# Patient Record
Sex: MALE | Race: WHITE | NOT HISPANIC OR LATINO | URBAN - NONMETROPOLITAN AREA
[De-identification: names, ages, dates, MRNs, and addresses within clinical notes are randomized per-mention and may not be internally consistent; named-entity substitution may affect disease eponyms.]

---

## 2020-01-06 ENCOUNTER — IMPORTED ENCOUNTER (OUTPATIENT)
Dept: URBAN - NONMETROPOLITAN AREA CLINIC 1 | Facility: CLINIC | Age: 65
End: 2020-01-06

## 2020-01-06 PROBLEM — H25.13: Noted: 2020-01-06

## 2020-01-06 PROBLEM — H40.053: Noted: 2020-01-06

## 2020-01-06 PROBLEM — H52.223: Noted: 2020-01-06

## 2020-01-06 PROBLEM — H52.4: Noted: 2020-01-06

## 2020-01-06 PROBLEM — H52.02: Noted: 2020-01-06

## 2020-01-06 PROCEDURE — 92015 DETERMINE REFRACTIVE STATE: CPT

## 2020-01-06 PROCEDURE — 92004 COMPRE OPH EXAM NEW PT 1/>: CPT

## 2020-01-06 NOTE — PATIENT DISCUSSION
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

## 2020-07-07 ENCOUNTER — IMPORTED ENCOUNTER (OUTPATIENT)
Dept: URBAN - NONMETROPOLITAN AREA CLINIC 1 | Facility: CLINIC | Age: 65
End: 2020-07-07

## 2020-07-07 PROBLEM — H25.13: Noted: 2020-07-07

## 2020-07-07 PROBLEM — H52.223: Noted: 2020-01-06

## 2020-07-07 PROBLEM — H52.4: Noted: 2020-01-06

## 2020-07-07 PROBLEM — H40.053: Noted: 2020-07-07

## 2020-07-07 PROBLEM — H52.02: Noted: 2020-01-06

## 2020-07-07 PROCEDURE — 76514 ECHO EXAM OF EYE THICKNESS: CPT

## 2020-07-07 PROCEDURE — 99213 OFFICE O/P EST LOW 20 MIN: CPT

## 2020-07-07 PROCEDURE — 92133 CPTRZD OPH DX IMG PST SGM ON: CPT

## 2020-07-07 NOTE — PATIENT DISCUSSION
Ocular Hypertension-  discussed findings w/patient-  IOP 16 16 stable -  C/D 0.6/0.7 OD and 0.6/0.75 OS-  OCT ONH done today 7/72020   OD: 8/10 SS 91um normal RNFL all quadrants    OS: 8/10 SS 91um normal RNFL all quadrants -  Pach done today 7/7/2020: 553 561-  discussed the possibility of iStent at the time of cataract surgery-  continue Latanoprost QHS OU reviewed the common side effects of long term use such as KASEY and lid conditions-  monitor 6 month Complete w/24-2 VF Cataracts OU -  discussed findings w/patient-  discussed referral to Artur Hines for Cat Eval in the near future will continue to monitor -  UV protection recommended-  BAT done today 7/7/2020 20/50- OD&OS-  discussed with patient the possibility of iStent at the time of cataract surgery -  monitor 6 month Complete w/BAT; 's Notes: MR 1/6/2020DFE 1/6/2020OCT Dre Akers 73 7/7/2020Pach 7/7/202024-2 VF

## 2021-01-07 ENCOUNTER — IMPORTED ENCOUNTER (OUTPATIENT)
Dept: URBAN - NONMETROPOLITAN AREA CLINIC 1 | Facility: CLINIC | Age: 66
End: 2021-01-07

## 2021-01-07 PROCEDURE — 92083 EXTENDED VISUAL FIELD XM: CPT

## 2021-01-07 NOTE — PATIENT DISCUSSION
Testing [id number] VFOD: R scattered scotoma non-specific defects baselineOS: R scattered scotoma non-specific defects baselineno glaucomatous defects noted OD/OS; 's Notes: MR 1/6/2020DFE 1/6/2020OCT Dre Akers 73 7/7/2020Pach 7/7/202024-2 VF 1/7/2021

## 2021-01-21 ENCOUNTER — IMPORTED ENCOUNTER (OUTPATIENT)
Dept: URBAN - NONMETROPOLITAN AREA CLINIC 1 | Facility: CLINIC | Age: 66
End: 2021-01-21

## 2021-01-21 PROCEDURE — 92015 DETERMINE REFRACTIVE STATE: CPT

## 2021-01-21 PROCEDURE — 92014 COMPRE OPH EXAM EST PT 1/>: CPT

## 2021-01-21 NOTE — PATIENT DISCUSSION
Simple Astigmatsim OD/ Mixed Hyperopic Astigmatism OS w/ Presbyopia-  discussed findings w/ patient-  no new spectacle Rx issued today -  monitor prnCombined Cataracts OU-  discussed findings w/ patient-  UV  protection Recommmended -  Significant progression noted today with patient's complaint of decreased VA-  Recommend referral to Dr. Burton Red for further evaluation/possible treatment pt agreed with plan. Ocular Hypertension OU: -  Discussed findings w/ pt today-  IOP noted at 15 OU stable.  -  C/D 0.6/0.7 OD and 0.6/0.75 OS-  OCT ONH done 7/72020       OD: 8/10 SS 91um normal RNFL all quadrants        OS: 8/10 SS 91um normal RNFL all quadrants -  Pach done 7/7/2020: 553 561-  24-2 VF done 1/7/2021        OD: R scattered scotoma non-specific defects baseline        OS: R scattered scotoma non-specific defects baseline no glaucomatous                defects noted OD/OS-  discussed the possibility of iStent at the time of cataract surgery and to discuss further with Dr. Burton Red at his evaluation-  continue Latanoprost QHS OU reviewed the common side effects of long term use such as KASEY and lid conditions- will continue to follow closely after cataract surgery has been completed if Dr. Burton Red recommend(s)Type II DM without retinopathy-  Discussed findings w/ pt today- No diabetic retinopathy noted on exam today- Mild retinopathy noted on exam- Stressed importance of good blood sugar control and how it can affect ocular health/vision- Continue to monitor; 's Notes: MR 1/21/2021DFE 1/21/2021OCT Dre Akers 73 7/7/2020Pach 7/7/202024-2 VF 1/7/2021

## 2021-01-30 ENCOUNTER — IMPORTED ENCOUNTER (OUTPATIENT)
Dept: URBAN - NONMETROPOLITAN AREA CLINIC 1 | Facility: CLINIC | Age: 66
End: 2021-01-30

## 2021-01-30 PROBLEM — H40.11X1: Noted: 2021-01-30

## 2021-01-30 PROBLEM — H25.13: Noted: 2021-01-30

## 2021-01-30 PROCEDURE — 92014 COMPRE OPH EXAM EST PT 1/>: CPT

## 2021-01-30 NOTE — PATIENT DISCUSSION
Cataract(s)-Visually significant cataract OU .-Cataract(s) causing symptomatic impairment of visual function not correctable with a tolerable change in glasses or contact lenses lighting or non-operative means resulting in specific activity limitations and/or participation restrictions including but not limited to reading viewing television driving or meeting vocational or recreational needs. -Expectation is clearer vision and functional improvement in symptoms as well as reduced glare disability after cataract removal.-Order IOLMaster and OPD today. -Recommend   Limbal Relaxing Incisions based on today's OPD testing and lifestyle questionnaire.-All questions were answered regarding surgery including pre and post-op medications appointments activity restrictions and anesthetic usage.-The risks benefits and alternatives and special risk factors for the patient were discussed in detail including but not limited to: bleeding infection retinal detachment vitreous loss problems with the implant and possible need for additional surgery.-Although rare the possibility of complete vision loss was discussed.-The possible need for glasses post-operatively was discussed.-Order medical clearance exam based on history of NIDDM -Patient elects to proceed with cataract surgery OD . Will schedule at patient's convenience and re-evaluate OS  in the future. Discussed all lens options w/ patinet  he elects Stand/Trad OU and understands he will need new gls rx s/p to correct his astigmatism. Discussed LenSX w/ LRI and benefits from this less w/ less dependence on glasses for distance and will only need glasses for reading.  POAG Disucssed w/ patinet Stable @ this timeContinue the Latanoprost OU QHS Reviewed recent VF and patient qualifies for Istent OU Informed him he will continue to use the gtts s/p unless Dr Boogie Estrada feels he is able to d/c.; 's Notes: MR 1/21/2021DFE 1/21/2021OCT Dre Akers 73 7/7/2020Pach 7/7/202024-2 VF 1/7/2021

## 2021-03-02 ENCOUNTER — IMPORTED ENCOUNTER (OUTPATIENT)
Dept: URBAN - NONMETROPOLITAN AREA CLINIC 1 | Facility: CLINIC | Age: 66
End: 2021-03-02

## 2021-03-02 PROBLEM — H40.11X1: Noted: 2021-03-02

## 2021-03-02 PROBLEM — H25.13: Noted: 2021-03-02

## 2021-03-09 ENCOUNTER — IMPORTED ENCOUNTER (OUTPATIENT)
Dept: URBAN - NONMETROPOLITAN AREA CLINIC 1 | Facility: CLINIC | Age: 66
End: 2021-03-09

## 2021-03-09 PROBLEM — H25.12: Noted: 2021-03-09

## 2021-03-09 PROBLEM — H40.11X1: Noted: 2021-03-09

## 2021-03-09 PROBLEM — I10: Noted: 2021-03-15

## 2021-03-09 PROBLEM — Z98.41: Noted: 2021-03-09

## 2021-03-09 PROBLEM — E78.5: Noted: 2021-03-15

## 2021-03-09 PROBLEM — Z01.818: Noted: 2021-03-15

## 2021-03-09 PROCEDURE — 99024 POSTOP FOLLOW-UP VISIT: CPT

## 2021-03-09 NOTE — PATIENT DISCUSSION
s/p PC IOL OD Stand/Trad w/Istent 3/8/2021-  discussed findings w/patient-  Pt doing well s/p PCIOL. -  Continue post-op gtts according to instruction sheet and sleep with eye shield over eye for 7 nights. -  Avoid bending at the waist lifting anything over 5lbs and dirty or lucy environments.-  RTC as scheduled or prn; 's Notes: MR 1/21/2021DFE 1/21/2021OCT Dre Akers 73 7/7/2020Pach 7/7/202024-2 VF 1/7/2021

## 2021-03-15 ENCOUNTER — IMPORTED ENCOUNTER (OUTPATIENT)
Dept: URBAN - NONMETROPOLITAN AREA CLINIC 1 | Facility: CLINIC | Age: 66
End: 2021-03-15

## 2021-03-15 PROCEDURE — 99024 POSTOP FOLLOW-UP VISIT: CPT

## 2021-03-15 NOTE — PATIENT DISCUSSION
Cataract(s)-Visually significant cataract OS . -Cataract(s) causing symptomatic impairment of visual function not correctable with a tolerable change in glasses or contact lenses lighting or non-operative means resulting in specific activity limitations and/or participation restrictions including but not limited to reading viewing television driving or meeting vocational or recreational needs. -Expectation is clearer vision and functional improvement in symptoms as well as reduced glare disability after cataract removal.-Recommend Stand/trad/Istent based on previous OPD testing and lifestyle questionnaire.-All questions were answered regarding surgery including pre and post-op medications appointments activity restrictions and anesthetic usage.-The risks benefits and alternatives and special risk factors for the patient were discussed in detail including but not limited to: bleeding infection retinal detachment vitreous loss problems with the implant and possible need for additional surgery.-Although rare the possibility of complete vision loss was discussed.-The need for glasses post-operatively was discussed.-Patient elects to proceed with cataract surgery OS . s/p PCIOL-Pt doing well at 1 week s/p PCIOL. -Continue post-op gtts according to instruction sheet.-Okay to resume usual activites and d/c eye shield.; 's Notes: MR 1/21/2021DFE 1/21/2021MAR Akers 73 7/7/2020Pach 7/7/202024-2 VF 1/7/2021

## 2021-03-15 NOTE — PATIENT DISCUSSION
Medical Clearance-Medical clearance done today. -No outstanding concerns that would preclude surgery.-Patient is cleared to proceed with scheduled surgery.; 's Notes: MR 1/21/2021DFE 1/21/2021OCT [facility] [place] 7/7/2020Pach 7/7/202024-2 VF 1/7/2021

## 2021-03-26 ENCOUNTER — IMPORTED ENCOUNTER (OUTPATIENT)
Dept: URBAN - NONMETROPOLITAN AREA CLINIC 1 | Facility: CLINIC | Age: 66
End: 2021-03-26

## 2021-03-26 PROBLEM — Z98.42: Noted: 2021-03-26

## 2021-03-26 PROCEDURE — 99024 POSTOP FOLLOW-UP VISIT: CPT

## 2021-03-26 NOTE — PATIENT DISCUSSION
s/p PC IOL OS Stand/Trad Istent 3/25/2021-  discussed findings w/patient-  Pt doing well s/p PCIOL. -  Continue post-op gtts according to instruction sheet and sleep with eye shield over eye for 7 nights. -  Avoid bending at the waist lifting anything over 5lbs and dirty or lucy environments.-  RTC as schedule or prn; 's Notes: MR 1/21/2021DFE 1/21/2021OCT [facility] 7/7/2020Pach 7/7/202024-2 VF 1/7/2021

## 2021-04-02 ENCOUNTER — IMPORTED ENCOUNTER (OUTPATIENT)
Dept: URBAN - NONMETROPOLITAN AREA CLINIC 1 | Facility: CLINIC | Age: 66
End: 2021-04-02

## 2021-04-02 PROCEDURE — 99024 POSTOP FOLLOW-UP VISIT: CPT

## 2021-04-02 NOTE — PATIENT DISCUSSION
s/p PC IOL OS Stand/Trad Istent 3/25/2021-  discussed findings w/patient-  Pt doing well at 1 week s/p PCIOL. -  Continue post-op gtts according to instruction sheet.-  Okay to resume usual activites and d/c eye shield. -  RTC 3 weeks PORM (may want to do CL's); 's Notes: MR 1/21/2021DFE 1/21/2021OCT Dre Akers 73 7/7/2020Pach 7/7/202024-2 VF 1/7/2021

## 2021-04-08 ENCOUNTER — IMPORTED ENCOUNTER (OUTPATIENT)
Dept: URBAN - NONMETROPOLITAN AREA CLINIC 1 | Facility: CLINIC | Age: 66
End: 2021-04-08

## 2021-04-08 PROBLEM — H20.021: Noted: 2021-04-08

## 2021-04-08 PROBLEM — Z98.42: Noted: 2021-03-26

## 2021-04-08 PROCEDURE — 99024 POSTOP FOLLOW-UP VISIT: CPT

## 2021-04-08 NOTE — PATIENT DISCUSSION
s/p PC IOL OS Stand/Trad Istent 3/25/2021-  discussed findings w/patient-  Pt doing well at 1 week s/p PCIOL. -  Continue post-op gtts according to instruction sheet.-  Okay to resume usual activites and d/c eye shield. -  RTC 3 weeks PORM (may want to do CL's)Rebound Iritis OD-  discussed findings w/ patient -  start Pred Forte TID OD x 1 week then BID x 1 week-  Start Bromsite QD OD-  contineu to monitor w/ 2-3 week f/u; 's Notes: MR 1/21/2021DFE 1/21/2021OCT Dre Akers 73 7/7/2020Pach 7/7/202024-2 VF 1/7/2021

## 2021-04-15 PROBLEM — H52.223: Noted: 2021-01-21

## 2021-04-15 PROBLEM — H52.4: Noted: 2021-01-21

## 2021-04-15 PROBLEM — H40.053: Noted: 2021-04-15

## 2021-04-15 PROBLEM — H52.02: Noted: 2021-01-21

## 2021-04-15 PROBLEM — H25.813: Noted: 2021-04-15

## 2021-04-15 PROBLEM — E11.9: Noted: 2021-04-15

## 2021-04-26 ENCOUNTER — IMPORTED ENCOUNTER (OUTPATIENT)
Dept: URBAN - NONMETROPOLITAN AREA CLINIC 1 | Facility: CLINIC | Age: 66
End: 2021-04-26

## 2021-04-26 PROCEDURE — 92015 DETERMINE REFRACTIVE STATE: CPT

## 2021-04-26 PROCEDURE — 99024 POSTOP FOLLOW-UP VISIT: CPT

## 2021-04-26 NOTE — PATIENT DISCUSSION
Pseudophakia OU-  discussed findings w/patient-  doing well at this time-  new spectacle/CL Rx issued-  continue to monitor at 3 mo DFE or prnRebound Iritis OD-  discussed findings w/ patient -  patient has finished drops-  doing well at this time-  continue to monitor at 3 mo DFE or prn; 's Notes: MR 4/26/2021DFE 1/21/2021OCT [facility] [place] 7/7/2020Pach 7/7/202024-2 VF 1/7/2021

## 2021-08-27 ENCOUNTER — IMPORTED ENCOUNTER (OUTPATIENT)
Dept: URBAN - NONMETROPOLITAN AREA CLINIC 1 | Facility: CLINIC | Age: 66
End: 2021-08-27

## 2021-08-27 PROCEDURE — 99024 POSTOP FOLLOW-UP VISIT: CPT

## 2021-08-27 NOTE — PATIENT DISCUSSION
Pseudophakia OU-  discussed findings w/patient-  doing well at this time-  PCO noted OU discussed signs associated w/progression -  continue to monitor Ocular Hypertension-  discussed diagnosis in detail w/ patient -  IOP 17 OU -  Cup to Disc: 0.6 OU -  Patient has been using latanoprost d/c Latanoprost at this time. -  RTC in 4-6 weeks for IOP check PVD OU-  Discussed findings of exam in detail with the patient. -  The risk of retinal detachment in patients with PVDs was discussed with the patient and the warning signs of retinal detachment were carefully reviewed with the patient. -  The patient was warned to return to the office or contact the ophthalmologist on call immediately if they experience signs of retinal detachment.; 's Notes: MR 4/26/2021DFE 1/21/2021OCT [facility] [place] 7/7/2020Pach 7/7/202024-2 VF 1/7/2021

## 2021-08-30 PROBLEM — H43.813: Noted: 2021-08-30

## 2021-08-30 PROBLEM — Z96.1: Noted: 2021-08-30

## 2021-08-30 PROBLEM — H26.493: Noted: 2021-08-30

## 2021-08-30 PROBLEM — H40.053: Noted: 2021-08-30

## 2021-11-16 ENCOUNTER — PREPPED CHART (OUTPATIENT)
Dept: URBAN - NONMETROPOLITAN AREA CLINIC 4 | Facility: CLINIC | Age: 66
End: 2021-11-16

## 2021-11-16 ENCOUNTER — IMPORTED ENCOUNTER (OUTPATIENT)
Dept: URBAN - NONMETROPOLITAN AREA CLINIC 1 | Facility: CLINIC | Age: 66
End: 2021-11-16

## 2021-11-16 PROCEDURE — 99213 OFFICE O/P EST LOW 20 MIN: CPT

## 2021-11-16 NOTE — PATIENT DISCUSSION
Ocular Hypertension-  discussed diagnosis in detail w/ patient -  IOPs 19 19-  Cup to Disc: 0.6 OU -  patient has istent OS-  restart Latanoprost QHS OU-  RTC 3 mo f/u w/OCT ON; 's Notes: MR 4/26/2021DFE 1/21/2021OCT Dre Akers 73 7/7/2020Pach 7/7/202024-2 VF 1/7/2021

## 2022-03-25 ASSESSMENT — VISUAL ACUITY
OS_SC: 20/30
OD_SC: 20/20

## 2022-03-29 ENCOUNTER — FOLLOW UP (OUTPATIENT)
Dept: URBAN - NONMETROPOLITAN AREA CLINIC 4 | Facility: CLINIC | Age: 67
End: 2022-03-29

## 2022-03-29 DIAGNOSIS — H40.053: ICD-10-CM

## 2022-03-29 PROCEDURE — 92133 CPTRZD OPH DX IMG PST SGM ON: CPT

## 2022-03-29 PROCEDURE — 99213 OFFICE O/P EST LOW 20 MIN: CPT

## 2022-03-29 ASSESSMENT — VISUAL ACUITY
OS_SC: 20/25
OD_SC: 20/20-1

## 2022-03-29 ASSESSMENT — TONOMETRY
OS_IOP_MMHG: 17
OD_IOP_MMHG: 16

## 2022-04-17 ASSESSMENT — VISUAL ACUITY
OS_CC: 20/30
OD_CC: 20/20 NV
OS_SC: 20/20-2
OU_CC: 20/15
OD_CC: 20/40+2
OU_CC: 20/20 NV
OS_GLARE: 20/50
OD_CC: 20/20
OU_SC: 20/25
OS_CC: 20/20
OU_CC: 20/20
OU_CC: 20/20
OD_SC: 20/30
OD_PH: 20/25-1
OU_CC: 20/20
OS_CC: 20/20-1
OS_PH: 20/20
OU_CC: J1+
OS_GLARE: 20/40
OS_CC: 20/40
OS_SC: 20/25-1
OD_CC: 20/20
OD_PAM: 20/20
OS_SC: 20/20
OS_GLARE: 20/50
OD_GLARE: 20/50
OD_SC: 20/25-1
OS_PH: 20/25
OD_CC: 20/20
OS_CC: 20/20
OD_SC: 20/25+2
OD_PH: 20/20
OS_GLARE: 20/50-
OS_CC: 20/30-1
OD_GLARE: 20/50-
OD_SC: 20/30+2
OS_AM: 20/20
OS_AM: 20/20
OD_GLARE: 20/40
OD_CC: 20/20+2
OS_CC: 20/40
OS_SC: 20/25-1

## 2022-04-17 ASSESSMENT — TONOMETRY
OS_IOP_MMHG: 17
OS_IOP_MMHG: 18
OD_IOP_MMHG: 16
OS_IOP_MMHG: 16
OS_IOP_MMHG: 17
OS_IOP_MMHG: 16
OS_IOP_MMHG: 21
OD_IOP_MMHG: 15
OS_IOP_MMHG: 15
OS_IOP_MMHG: 19
OD_IOP_MMHG: 17
OS_IOP_MMHG: 18
OD_IOP_MMHG: 15
OD_IOP_MMHG: 15
OS_IOP_MMHG: 18
OD_IOP_MMHG: 18
OD_IOP_MMHG: 17
OD_IOP_MMHG: 15
OS_IOP_MMHG: 16
OS_IOP_MMHG: 18
OD_IOP_MMHG: 17
OD_IOP_MMHG: 16
OD_IOP_MMHG: 16
OD_IOP_MMHG: 19

## 2022-04-17 ASSESSMENT — PACHYMETRY
OD_CT_UM: 553; ADJ: THIN
OS_CT_UM: 561; ADJ: WNL
OD_CT_UM: 553; ADJ: THIN
OD_CT_UM: 553; ADJ: THIN
OS_CT_UM: 561; ADJ: WNL
OS_CT_UM: 561; ADJ: WNL
OD_CT_UM: 553; ADJ: THIN
OS_CT_UM: 561; ADJ: WNL
OD_CT_UM: 553; ADJ: THIN
OS_CT_UM: 561; ADJ: WNL
OD_CT_UM: 553; ADJ: THIN
OS_CT_UM: 561; ADJ: WNL
OD_CT_UM: 553; ADJ: THIN
OD_CT_UM: 553; ADJ: THIN
OS_CT_UM: 561; ADJ: WNL
OS_CT_UM: 561; ADJ: WNL

## 2022-11-09 ENCOUNTER — CONSULTATION/EVALUATION (OUTPATIENT)
Dept: URBAN - NONMETROPOLITAN AREA CLINIC 4 | Facility: CLINIC | Age: 67
End: 2022-11-09

## 2022-11-09 DIAGNOSIS — H26.493: ICD-10-CM

## 2022-11-09 PROCEDURE — 99214 OFFICE O/P EST MOD 30 MIN: CPT

## 2022-11-09 PROCEDURE — 66821 AFTER CATARACT LASER SURGERY: CPT

## 2022-11-09 ASSESSMENT — VISUAL ACUITY
OD_SC: 20/20-2
OS_SC: 20/30
OS_BAT: 20/40
OD_BAT: 20/40-1

## 2022-11-09 ASSESSMENT — TONOMETRY
OS_IOP_MMHG: 18
OD_IOP_MMHG: 17

## 2022-11-09 NOTE — PATIENT DISCUSSION
Observe for changes and progression. Patient to continue with current treatment regimen. Improving Improving Improving

## 2022-11-09 NOTE — PROCEDURE NOTE: CLINICAL
PROCEDURE NOTE: YAG Capsulotomy OS. Diagnosis: Other Secondary Cataract. Anesthesia: Topical. The purpose and nature of the procedure, possible alternative methods of treatment, the risks involved and the possibility of complications were discussed with patient. The Patient wishes to proceed and the consent was signed. 1 gtt Prolensa applied. The laser was then performed under topical anesthesia with no complications. Post op instructions were given to patient as well as a follow-up appointment. Patient was advised to call our office if any questions or concerns. 1.9 MJ # 32 shots.

## 2022-11-21 ENCOUNTER — CLINIC PROCEDURE ONLY (OUTPATIENT)
Dept: URBAN - NONMETROPOLITAN AREA CLINIC 4 | Facility: CLINIC | Age: 67
End: 2022-11-21

## 2022-11-21 DIAGNOSIS — H26.491: ICD-10-CM

## 2022-11-21 PROCEDURE — 66821 AFTER CATARACT LASER SURGERY: CPT

## 2022-11-21 ASSESSMENT — VISUAL ACUITY
OD_SC: 20/30
OU_SC: 20/20
OD_BAT: 20/40
OU_SC: 20/50
OS_SC: 20/20-1

## 2022-11-21 ASSESSMENT — TONOMETRY
OS_IOP_MMHG: 19
OD_IOP_MMHG: 19

## 2022-11-21 NOTE — PATIENT DISCUSSION
(Surgical)  Visually Significant secondary to glare; schedule YAG OD Cap. Pros, cons, risks and benefits discussed with patient. Patient wishes to proceed.

## 2022-11-21 NOTE — PROCEDURE NOTE: CLINICAL
PROCEDURE NOTE: YAG Capsulotomy OD. Diagnosis: Other Secondary Cataract. Anesthesia: Topical. The purpose and nature of the procedure, possible alternative methods of treatment, the risks involved and the possibility of complications were discussed with patient. The Patient wishes to proceed and the consent was signed. 1 gtt Prolensa applied. The laser was then performed under topical anesthesia with no complications. Post op instructions were given to patient as well as a follow-up appointment. Patient was advised to call our office if any questions or concerns. 12 shots MJ 2.4.

## 2022-12-08 ENCOUNTER — POST-OP (OUTPATIENT)
Dept: URBAN - NONMETROPOLITAN AREA CLINIC 4 | Facility: CLINIC | Age: 67
End: 2022-12-08

## 2022-12-08 PROCEDURE — 92310-E CONTACT LENS FITTING ESTABLISH PATIENT

## 2022-12-08 PROCEDURE — 92015 DETERMINE REFRACTIVE STATE: CPT

## 2022-12-08 PROCEDURE — 99024 POSTOP FOLLOW-UP VISIT: CPT

## 2022-12-08 ASSESSMENT — VISUAL ACUITY
OS_SC: 20/30
OD_SC: 20/25

## 2022-12-08 ASSESSMENT — TONOMETRY
OD_IOP_MMHG: 18
OS_IOP_MMHG: 16

## 2023-06-22 ENCOUNTER — FOLLOW UP (OUTPATIENT)
Dept: URBAN - NONMETROPOLITAN AREA CLINIC 4 | Facility: CLINIC | Age: 68
End: 2023-06-22

## 2023-06-22 DIAGNOSIS — H43.813: ICD-10-CM

## 2023-06-22 DIAGNOSIS — Z79.4: ICD-10-CM

## 2023-06-22 DIAGNOSIS — H40.1131: ICD-10-CM

## 2023-06-22 DIAGNOSIS — Z98.890: ICD-10-CM

## 2023-06-22 DIAGNOSIS — Z96.1: ICD-10-CM

## 2023-06-22 DIAGNOSIS — E11.9: ICD-10-CM

## 2023-06-22 PROCEDURE — 92014 COMPRE OPH EXAM EST PT 1/>: CPT

## 2023-06-22 ASSESSMENT — VISUAL ACUITY
OS_CC: 20/30
OD_SC: 20/25

## 2023-06-22 ASSESSMENT — TONOMETRY
OS_IOP_MMHG: 14
OD_IOP_MMHG: 14

## 2024-05-09 ENCOUNTER — ESTABLISHED PATIENT (OUTPATIENT)
Dept: URBAN - NONMETROPOLITAN AREA CLINIC 4 | Facility: CLINIC | Age: 69
End: 2024-05-09

## 2024-05-09 DIAGNOSIS — Z98.890: ICD-10-CM

## 2024-05-09 DIAGNOSIS — H40.1131: ICD-10-CM

## 2024-05-09 DIAGNOSIS — Z96.1: ICD-10-CM

## 2024-05-09 DIAGNOSIS — E11.9: ICD-10-CM

## 2024-05-09 DIAGNOSIS — H52.01: ICD-10-CM

## 2024-05-09 DIAGNOSIS — Z79.4: ICD-10-CM

## 2024-05-09 DIAGNOSIS — H43.813: ICD-10-CM

## 2024-05-09 PROCEDURE — 92133 CPTRZD OPH DX IMG PST SGM ON: CPT

## 2024-05-09 PROCEDURE — 92015 DETERMINE REFRACTIVE STATE: CPT

## 2024-05-09 PROCEDURE — 92014 COMPRE OPH EXAM EST PT 1/>: CPT

## 2024-05-09 ASSESSMENT — TONOMETRY
OS_IOP_MMHG: 15
OD_IOP_MMHG: 15

## 2024-05-09 ASSESSMENT — VISUAL ACUITY
OS_SC: 20/30
OD_SC: 20/20-2

## 2025-01-08 ENCOUNTER — FOLLOW UP (OUTPATIENT)
Age: 70
End: 2025-01-08

## 2025-01-08 DIAGNOSIS — E11.9: ICD-10-CM

## 2025-01-08 DIAGNOSIS — Z96.1: ICD-10-CM

## 2025-01-08 DIAGNOSIS — H43.813: ICD-10-CM

## 2025-01-08 DIAGNOSIS — H52.01: ICD-10-CM

## 2025-01-08 DIAGNOSIS — H40.1131: ICD-10-CM

## 2025-01-08 DIAGNOSIS — Z79.4: ICD-10-CM

## 2025-01-08 DIAGNOSIS — Z98.890: ICD-10-CM

## 2025-01-08 PROCEDURE — 99213 OFFICE O/P EST LOW 20 MIN: CPT

## 2025-01-08 PROCEDURE — 92083 EXTENDED VISUAL FIELD XM: CPT

## 2025-07-22 ENCOUNTER — COMPREHENSIVE EXAM (OUTPATIENT)
Age: 70
End: 2025-07-22

## 2025-07-22 DIAGNOSIS — Z79.4: ICD-10-CM

## 2025-07-22 DIAGNOSIS — H40.1131: ICD-10-CM

## 2025-07-22 DIAGNOSIS — Z96.1: ICD-10-CM

## 2025-07-22 DIAGNOSIS — Z98.890: ICD-10-CM

## 2025-07-22 DIAGNOSIS — H52.01: ICD-10-CM

## 2025-07-22 DIAGNOSIS — E11.9: ICD-10-CM

## 2025-07-22 DIAGNOSIS — H43.813: ICD-10-CM

## 2025-07-22 PROCEDURE — 99213 OFFICE O/P EST LOW 20 MIN: CPT

## 2025-07-22 PROCEDURE — 92133 CPTRZD OPH DX IMG PST SGM ON: CPT
